# Patient Record
Sex: FEMALE | Race: WHITE
[De-identification: names, ages, dates, MRNs, and addresses within clinical notes are randomized per-mention and may not be internally consistent; named-entity substitution may affect disease eponyms.]

---

## 2018-03-06 ENCOUNTER — HOSPITAL ENCOUNTER (OUTPATIENT)
Dept: HOSPITAL 25 - ED | Age: 83
Setting detail: OBSERVATION
LOS: 1 days | Discharge: HOME | End: 2018-03-07
Attending: INTERNAL MEDICINE | Admitting: INTERNAL MEDICINE
Payer: MEDICARE

## 2018-03-06 DIAGNOSIS — R53.83: ICD-10-CM

## 2018-03-06 DIAGNOSIS — Z79.82: ICD-10-CM

## 2018-03-06 DIAGNOSIS — I25.10: ICD-10-CM

## 2018-03-06 DIAGNOSIS — R00.2: ICD-10-CM

## 2018-03-06 DIAGNOSIS — N18.9: ICD-10-CM

## 2018-03-06 DIAGNOSIS — R68.84: ICD-10-CM

## 2018-03-06 DIAGNOSIS — R07.9: Primary | ICD-10-CM

## 2018-03-06 DIAGNOSIS — Z95.5: ICD-10-CM

## 2018-03-06 LAB
BASOPHILS # BLD AUTO: 0.1 10^3/UL (ref 0–0.2)
EOSINOPHIL # BLD AUTO: 0.3 10^3/UL (ref 0–0.6)
HCT VFR BLD AUTO: 39 % (ref 35–47)
HGB BLD-MCNC: 12.6 G/DL (ref 12–16)
INR PPP/BLD: 0.87 (ref 0.77–1.02)
LYMPHOCYTES # BLD AUTO: 1.6 10^3/UL (ref 1–4.8)
MCH RBC QN AUTO: 29 PG (ref 27–31)
MCHC RBC AUTO-ENTMCNC: 33 G/DL (ref 31–36)
MCV RBC AUTO: 87 FL (ref 80–97)
MONOCYTES # BLD AUTO: 0.9 10^3/UL (ref 0–0.8)
NEUTROPHILS # BLD AUTO: 6 10^3/UL (ref 1.5–7.7)
NRBC # BLD AUTO: 0 10^3/UL
NRBC BLD QL AUTO: 0
PLATELET # BLD AUTO: 197 10^3/UL (ref 150–450)
RBC # BLD AUTO: 4.41 10^6/UL (ref 4–5.4)
WBC # BLD AUTO: 8.8 10^3/UL (ref 3.5–10.8)

## 2018-03-06 PROCEDURE — G0378 HOSPITAL OBSERVATION PER HR: HCPCS

## 2018-03-06 PROCEDURE — 84439 ASSAY OF FREE THYROXINE: CPT

## 2018-03-06 PROCEDURE — 96374 THER/PROPH/DIAG INJ IV PUSH: CPT

## 2018-03-06 PROCEDURE — 85610 PROTHROMBIN TIME: CPT

## 2018-03-06 PROCEDURE — 84443 ASSAY THYROID STIM HORMONE: CPT

## 2018-03-06 PROCEDURE — 36415 COLL VENOUS BLD VENIPUNCTURE: CPT

## 2018-03-06 PROCEDURE — 84484 ASSAY OF TROPONIN QUANT: CPT

## 2018-03-06 PROCEDURE — 85025 COMPLETE CBC W/AUTO DIFF WBC: CPT

## 2018-03-06 PROCEDURE — 82553 CREATINE MB FRACTION: CPT

## 2018-03-06 PROCEDURE — 83735 ASSAY OF MAGNESIUM: CPT

## 2018-03-06 PROCEDURE — A9502 TC99M TETROFOSMIN: HCPCS

## 2018-03-06 PROCEDURE — 81003 URINALYSIS AUTO W/O SCOPE: CPT

## 2018-03-06 PROCEDURE — 83880 ASSAY OF NATRIURETIC PEPTIDE: CPT

## 2018-03-06 PROCEDURE — 84481 FREE ASSAY (FT-3): CPT

## 2018-03-06 PROCEDURE — 78452 HT MUSCLE IMAGE SPECT MULT: CPT

## 2018-03-06 PROCEDURE — 71045 X-RAY EXAM CHEST 1 VIEW: CPT

## 2018-03-06 PROCEDURE — 82550 ASSAY OF CK (CPK): CPT

## 2018-03-06 PROCEDURE — 99284 EMERGENCY DEPT VISIT MOD MDM: CPT

## 2018-03-06 PROCEDURE — 93005 ELECTROCARDIOGRAM TRACING: CPT

## 2018-03-06 PROCEDURE — 93017 CV STRESS TEST TRACING ONLY: CPT

## 2018-03-06 PROCEDURE — 80053 COMPREHEN METABOLIC PANEL: CPT

## 2018-03-06 PROCEDURE — 85730 THROMBOPLASTIN TIME PARTIAL: CPT

## 2018-03-06 RX ADMIN — HEPARIN SODIUM SCH UNITS: 5000 INJECTION INTRAVENOUS; SUBCUTANEOUS at 15:57

## 2018-03-06 RX ADMIN — HEPARIN SODIUM SCH UNITS: 5000 INJECTION INTRAVENOUS; SUBCUTANEOUS at 22:14

## 2018-03-06 NOTE — RAD
Indication: Chest and jaw pain. Previous myocardial infarction and stent placement.



Comparison: September 03, 2008



Technique: Upright AP 1107 hours



Report: Elevated lung volumes. No focal pulmonary lesion, compelling alveolar

consolidation, pleural effusion, pneumothorax. Top normal heart size. Unremarkable central

pulmonary vasculature and mediastinal contours.



IMPRESSION: 

1. Stigmata of potential chronic obstructive pulmonary disease.

2. Top normal heart size accounting for portable AP technique.

3. No acute cardiopulmonary process evident.

## 2018-03-06 NOTE — HP
ADMISSION HISTORY AND PHYSICAL:

 

DATE OF ADMISSION:  03/06/18

 

PRIMARY CARE PROVIDER:  Dr. Shanita Forrest in [place].

 

CARDIOLOGIST:  Dr. Martinez.

 

HEALTHCARE PROXY:  Her daughter.

 

CODE STATUS:  Full.

 

SOURCE OF INFORMATION:  History obtained from interview with the patient, 
review of past medical records including Dr. Martinez's most recent notes.

 

RELIABILITY:  Good.

 

CHIEF COMPLAINT:  Jaw pain.

 

HISTORY OF PRESENT ILLNESS:  This is an 89-year-old female with past medical 
history of CAD, required stent to RCA in 2004 with in-stent restenosis in 2008, 
intervened upon at [facility], presented at that time with jaw tightness, 
who had been in her usual state of health except for increasing back pain over 
the last weeks to months, awoke from sleep at 3 a.m. the night of presentation 
with jaw pain on bilateral sides, but no chest pressure or pain associated with 
back pain, palpitations, diaphoresis and sensation that she was "out of it."  
The pain lasted for approximately 20 minutes and then resolved on its own.  She 
has noticed she felt increasingly fatigued especially with ambulation to the 
mailbox or upstairs for several months in addition to having worsening back pain
, not associated with her jaw.  She does note that her "dentures don't fit good
" which she thinks maybe contributing to her jaw pain, but not last night.  She 
noted increased weight at her previous PCP visits for which she was started on 
indapamide, which she has recently discontinued.  She is booked to Dr. Martinez's 
office today and her stress test, which was previously scheduled for May, was 
moved up to 

03/16/18.  Her daughters are both nurses, who when heard about her jaw 
tightness and pain were concerned and recommended evaluation in the emergency 
room.

 

PAST MEDICAL HISTORY:  Includes palpitations, chronic ischemic heart disease, 
coronary atherosclerosis, history of two stents, CKD.

 

MEDICATIONS:  Reviewed, include indapamide, although the patient notes she has 
discontinued this 2.5 mg daily; CoQ10 75 mg daily; Crestor 40 mg daily; vitamin 
B12 500 mcg daily; vitamin D 400 units daily; metoprolol succinate 100 mg daily
; lisinopril 10 mg daily; aspirin 81 mg daily.

 

ALLERGIES:  SULFA ANTIBIOTICS, PENICILLIN, DARVON, and MACRODANTIN.

 

FAMILY HISTORY:  No heart disease.

 

SOCIAL HISTORY:  , retired nurse at [employer].  Never been a smoker.  No 
alcohol. No illicit.  Drinks tea.

 

REVIEW OF SYSTEMS:  As per HPI.  Otherwise, all other systems negative.

 

                               PHYSICAL EXAMINATION

 

GENERAL:  Younger than stated age, interactive, pleasant, in no apparent 
distress.

 

VITAL SIGNS:  When seen by this author, blood pressure 118/97, heart rate 61, 
respiratory rate is 17.  She is 97% on room air.  T-max in the emergency room 
is 98.9.

 

HEENT:  Oropharynx is clear.  She has moist mucous membranes.

 

NECK:  She has non-elevated JVD.  No supraclavicular or cervical 
lymphadenopathy.

 

LUNGS:  Clear to auscultation.

 

HEART:  She has regular rate and rhythm.  No murmurs, rubs, or gallops.

 

ABDOMEN:  Soft, nontender, nondistended.

 

EXTREMITIES:  Warm, well perfused.  She has trace to 1+ lower extremity edema 
bilaterally.

 

NEUROLOGIC:  She is alert and oriented x3.  Her cranial nerves II through XII 
are intact.  Gait is not assessed.  She has no apparent anxiety, agitation, or 
depression.  She is pleasant and interactive.

 

 DIAGNOSTIC STUDIES/LAB DATA:  Pertinent laboratory data reviewed. White blood 
cell count is 8.8, hemoglobin 12.6, platelets 197.  INR 0.8.  BUN is 39, 
creatinine 1.47.  Troponin I 0.00.  BNP 61.  TSH elevated at 8.25.

 

Data reviewed.  Chest x-ray, impression:  Stigmata of potential COPD, top normal
- sized heart, although AP film.

 

EKG, sick sinus arrhythmia with frequent PVCs.  No ST or T-wave inversions of 
V2 and T-wave flattening aVF.  No other ST or T-wave changes.

 

ASSESSMENT AND PLAN:  This is an 89-year-old female with past medical history 
of coronary artery disease status post 2 stents, presenting with jaw tightness 
reminiscent of previous non-ST elevation myocardial infarction.

 

1.  Jaw pain/jaw tightness.  Trend troponins.  Continue aspirin and telemetry. 
Repeat EKG.  Continue statin at home dose.  We will plan on Myoview tomorrow. 
Holding metoprolol as failed to achieve goal heart rate on previous testing.  
Can restart metoprolol after stress test.

2.  Palpitations.  Holding metoprolol as indicated above.

3.  Elevated TSH.  Add on free T3 and free T4, although notably may not be 
reliable in the setting of acute event.

4.  Chronic kidney disease.  Dose meds accordingly.

5.  DVT prophylaxis.  Heparin subcu.

6.  Code status is full.

 

 

 

[id number]/[facility] #: [id number]

MTDD

## 2018-03-07 VITALS — DIASTOLIC BLOOD PRESSURE: 74 MMHG | SYSTOLIC BLOOD PRESSURE: 132 MMHG

## 2018-03-07 RX ADMIN — HEPARIN SODIUM SCH UNITS: 5000 INJECTION INTRAVENOUS; SUBCUTANEOUS at 05:51

## 2018-03-07 RX ADMIN — HEPARIN SODIUM SCH UNITS: 5000 INJECTION INTRAVENOUS; SUBCUTANEOUS at 14:58

## 2018-03-07 NOTE — ED
Callum OLEARY Thomas, scribed for Felix Hale MD on 03/06/18 at 1124 .





HPI Chest Pain





- HPI Summary


HPI Summary: 





The patient is an 89 year old female who was woken up this morning at 03:00 

with jaw pain and chest pain. This episode of pain lasted 15 minutes. The jaw 

pain was worse than the chest pain. In the emergency department, she does not 

have any chest pain or jaw pain. The patient complains of some intermittent 

diaphoresis. She denies nausea and vomiting. At baseline, the patient is easy 

to fatigue and she has leg swelling. Past medical history includes MI. 





- History of Current Complaint


Chief Complaint: EDChestPainROMI


Time Seen by Provider: 03/06/18 10:59


Onset/Duration: Started Hours Ago, Resolved


Timing: Lasting Minutes - 15


Initial Severity: Moderate


Current Severity: None


Pain Intensity: 5


Pain Scale Used: 0-10 Numeric


Aggravating Factor(s): Nothing


Alleviating Factor(s): Spontaneous Resolution


Associated Signs and Symptoms: Positive: Other: - Chest pain, jaw pain, 

diaphoresis, fatigue, leg swelling; NEGATIVE: nausea, vomiting





- Allergy/Home Medications


Allergies/Adverse Reactions: 


 Allergies











Allergy/AdvReac Type Severity Reaction Status Date / Time


 


MS Nitrofurantoin Allergy Severe Rash Verified 01/04/16 18:55





[From Macrodantin]     


 


MS Penicillins [Penicillins] Allergy Severe Rash Verified 01/04/16 18:55


 


MS Sulfa Antibiotics Allergy Severe Rash Verified 01/04/16 18:55





[Sulfa Antibiotics]     


 


MS Propoxyphene [From Darvon] Allergy Unknown Unknown Verified 01/04/16 18:55





   Reaction  





   Details  











Home Medications: 


 Home Medications





Cholecalciferol TAB* [Vitamin D TAB*] 400 unit PO DAILY 03/06/18 [History 

Confirmed 03/06/18]


Cyanocobalamin TAB* [Vitamin B12 TAB*] 500 mcg PO DAILY 03/06/18 [History 

Confirmed 03/06/18]


Indapamide TAB* [Lozol TAB*] 2.5 mg PO DAILY PRN 03/06/18 [History Confirmed 03/ 06/18]


Lisinopril TAB* [Prinivil TAB*] 10 mg PO DAILY 03/06/18 [History Confirmed 03/06 /18]


Metoprolol Succinate XL TAB* [Toprol XL TAB*] 100 mg PO DAILY 03/06/18 [History 

Confirmed 03/06/18]


Rosuvastatin (NF) [Crestor (NF)] 40 mg PO DAILY 03/06/18 [History Confirmed 03/ 06/18]


Ubidecarenone [Co Q-10] 75 mg PO DAILY 03/06/18 [History Confirmed 03/06/18]











PMH/Surg Hx/FS Hx/Imm Hx


Cardiovascular History: Reports: Hx Hypertension, Hx Myocardial Infarction


Opthamlomology History: 


   Denies: Hx Legally Blind





- Surgical History


Surgery Procedure, Year, and Place: PTCA WITH STENT PLACEMENT X2, HYSTERECTOMY


Infectious Disease History: No


Infectious Disease History: Reports: Hx Shingles


   Denies: Traveled Outside the [place] in Last 30 Days





- Family History


Known Family History: Positive: Other - Pericardial tamponade





- Social History


Alcohol Use: None


Substance Use Type: Reports: None


Smoking Status (MU): Never Smoked Tobacco





Review of Systems


Positive: Fatigue, Skin Diaphoresis


Positive: Chest Pain


Negative: Vomiting, Nausea


Positive: Other - jaw pain, leg swelling


All Other Systems Reviewed And Are Negative: Yes





Physical Exam





- Summary


Physical Exam Summary: 





VITAL SIGNS: Reviewed.


GENERAL: Patient is a well-developed and nourished female who is lying 

comfortable in the stretcher. Patient is not in any acute respiratory distress.


HEAD AND FACE: No signs of trauma. No ecchymosis, hematomas or skull 

depressions. No sinus tenderness.


EYES: PERRLA, EOMI x 2, No injected conjunctiva, no nystagmus.


EARS: Hearing grossly intact. Ear canals and tympanic membranes are within 

normal limits.


MOUTH: Oropharynx within normal limits.


NECK: Supple, trachea is midline, no adenopathy, no JVD, no carotid bruit, no c-

spine tenderness, neck with full ROM.


CHEST: Symmetric, no tenderness at palpation


LUNGS: Clear to auscultation bilaterally. No wheezing or crackles.


CVS: Regular rate and rhythm, S1 and S2 present, no murmurs or gallops 

appreciated.


ABDOMEN: Soft, non-tender. No signs of distention. No rebound no guarding, and 

no masses palpated. Bowel sounds are normal.


EXTREMITIES: FROM in all major joints, no edema, no cyanosis or clubbing.


NEURO: Alert and oriented x 3. No acute neurological deficits. Speech is normal 

and follows commands.


SKIN: Dry and warm


Triage Information Reviewed: Yes


Vital Signs On Initial Exam: 


 Initial Vitals











Temp Pulse Resp BP Pulse Ox


 


 98.9 F   75   18   133/62   98 


 


 03/06/18 10:41  03/06/18 10:41  03/06/18 10:41  03/06/18 10:41  03/06/18 10:41











Vital Signs Reviewed: Yes





Diagnostics





- Vital Signs


 Vital Signs











  Temp Pulse Resp BP Pulse Ox


 


 03/06/18 10:41  98.9 F  75  18  133/62  98














- Laboratory


Lab Results: 


 Lab Results











  03/06/18 Range/Units





  11:00 


 


WBC  8.8  (3.5-10.8)  10^3/ul


 


RBC  4.41  (4.0-5.4)  10^6/ul


 


Hgb  12.6  (12.0-16.0)  g/dl


 


Hct  39  (35-47)  %


 


MCV  87  (80-97)  fL


 


MCH  29  (27-31)  pg


 


MCHC  33  (31-36)  g/dl


 


RDW  14  (10.5-15)  %


 


Plt Count  197  (150-450)  10^3/ul


 


MPV  10  (7.4-10.4)  um3


 


Neut % (Auto)  68.2  (38-83)  %


 


Lymph % (Auto)  17.8 L  (25-47)  %


 


Mono % (Auto)  10.4 H  (0-7)  %


 


Eos % (Auto)  3.0  (0-6)  %


 


Baso % (Auto)  0.6  (0-2)  %


 


Absolute Neuts (auto)  6.0  (1.5-7.7)  10^3/ul


 


Absolute Lymphs (auto)  1.6  (1.0-4.8)  10^3/ul


 


Absolute Monos (auto)  0.9 H  (0-0.8)  10^3/ul


 


Absolute Eos (auto)  0.3  (0-0.6)  10^3/ul


 


Absolute Basos (auto)  0.1  (0-0.2)  10^3/ul


 


Absolute Nucleated RBC  0  10^3/ul


 


Nucleated RBC %  0  











Result Diagrams: 


 03/06/18 11:00





 03/06/18 11:00


Lab Statement: Any lab studies that have been ordered have been reviewed, and 

results considered in the medical decision making process.





- Radiology


  ** CXR


Xray Interpretation: No Acute Changes - 1. Stigmata of potential chronic 

obstructive pulmonary disease. 2. Top normal heart size accounting for portable 

AP technique. 3. No acute cardiopulmonary process evident. Dr. Hale has 

reviewed this report.


Radiology Interpretation Completed By: Radiologist





- EKG


  ** 10:56


Cardiac Rate: NL


EKG Rhythm: Sinus Rhythm - at 74 BPM


Ectopy: PVCs - multiple





Chest Pain Course/Dx





- Course


Assessment/Plan: The patient is an 89 year old female who was woken up this 

morning at 03:00 with jaw pain and chest pain. This episode of pain lasted 15 

minutes. The jaw pain was worse than the chest pain. In the emergency department

, she does not have any chest pain or jaw pain. The patient complains of some 

intermittent diaphoresis. She denies nausea and vomiting. At baseline, the 

patient is easy to fatigue and she has leg swelling. Past medical history 

includes MI.  Test results are without any significant abnormality. Troponin is 

0.00. EKG does not show any ST elevations. CXR shows 1. Stigmata of potential 

chronic obstructive pulmonary disease. 2. Top normal heart size accounting for 

portable AP technique. 3. No acute cardiopulmonary process evident. Because of 

the comorbidities and the symptom presentations, I discussed the case with Dr. Kebede, who accepts the patient for admission to rule out acute coronary 

syndrome. The patient is hemodynamically stable and alert and oriented x3.





- Diagnoses


Provider Diagnoses: 


 Chest pain rule out ACS








- Provider Notifications


Discussed Care Of Patient With: Jorge Kebede


Time Discussed With Above Provider: 12:50


Instructed by Provider To: Admit As Observation





Discharge





- Discharge Plan


Condition: Stable


Disposition: ADMITTED TO [facility]





The documentation as recorded by the Callum hester Thomas accurately reflects 

the service I personally performed and the decisions made by me, Felix Hale MD.

## 2018-03-07 NOTE — RAD
Edited for charges.



Indication: Chest pain, jaw pain.



Myocardial perfusion scan was performed utilizing 1 day protocol. Rest 
myocardial

perfusion was performed after intravenous injection of 11.0 mCi of technetium 
99m

tetrofosmin. Pharmacological stress was applied and 25.7 mCi of technetium 99 
and

tetrofosmin was injected for the stress portion of the study.



There is homogeneous distribution of the radiotracer throughout the left 
ventricle. There

is no evidence of fixed or reversible perfusion defects identified.



The ejection fraction at stress is 75%. Evaluation of wall motion demonstrates 
no focal

wall motion abnormality.



IMPRESSION: No definite fixed or reversible perfusion defect is identified.



ASSESSMENT:  Low risk



Based on imaging criteria from ACC/AHA 2002 Guideline Update for the Management 
of

Patients With Chronic Stable Angina Table 23. Noninvasive Risk Stratification.



MTDD

## 2018-03-08 NOTE — DS
DISCHARGE SUMMARY:

 

DATE OF ADMISSION:  03/06/18

 

DATE OF DISCHARGE:  03/07/18

 

PRIMARY CARE PHYSICIAN:  Dr. Shanita Forrest in [place].

 

CARDIOLOGIST:  Dr. Martinez.

 

PRIMARY DIAGNOSIS:  Chest/jaw pain.

 

SECONDARY DIAGNOSES:  Include:

1.  Coronary artery disease.

2.  History of PCI and in-stent restenosis 2004 and 2008 respectively.

3.  Chronic kidney disease.

 

PERTINENT IMAGING AND PROCEDURES PERFORMED DURING HOSPITAL STAY:  Chemical 
stress test with Myoview.  Impression:  Low risk, ejection fraction is 75%.  
Evaluation of wall motion demonstrates no focal wall motion abnormalities.

 

PERTINENT LABORATORY DATA FROM HOSPITAL STAY:  Troponin I at 0.00 on three 
consecutive checks.  TSH 8.25, free T4 of 0.84 and free T3 is 3.20, creatinine 
1.47.

 

MEDICATIONS ON DISCHARGE:  Unchanged from admission include:

1.  CoQ10 75 mg daily.

2.  Crestor 40 mg daily.

3.  Vitamin B12 500 mcg daily.

4.  Vitamin D 400 units daily.

5.  Metoprolol succinate 100 mg daily.

6.  Lisinopril 10 mg daily.

7.  Aspirin 81 mg daily.

 

HISTORY OF PRESENT ILLNESS AND HOSPITAL COURSE:  This is an 89-year-old female, 
younger than stated age with a past medical history as outlined in the history 
of present illness on the day of admission, who woke from sleep on the night of 
admission with jaw pain associated with nausea as well as diaphoresis.  She 
discussed this with her cardiologist office who scheduled a stress test for the 
following weak; however, on further discussion with her daughter presented to 
the emergency room for evaluation.  It was thought prudent by this author to 
admit to the hospital and check a stress test, given history of CAD as well as 
sensation potential for anginal equivalent.  The stress test was low risk as 
indicated above and the patient remained without symptoms during the course of 
a hospital stay. There were no complications during the course of the hospital 
stay.  She will follow up with her PCP and cardiologist as indicated.  The 
patient was cautioned to return to the hospital should she have recurrent or 
worsening chest pain or discomfort jaw tightness, nausea, vomiting, 
lightheadedness, loss of consciousness or near loss of consciousness.  She was 
cautioned that a low risk stress does not fully rule out cardiac disease.  
There are no complications during this patient's hospital stay.

 

At followup please:  No specific labs or vitals that need follow-up.

 

TIME SPENT:  Greater than 45 minutes were spent on discharge of this patient, 
greater than half was spent face-to-face with the patient.

 

 [id number]/[facility] #: [id number]

JULISA